# Patient Record
Sex: FEMALE | Race: OTHER | Employment: FULL TIME | URBAN - METROPOLITAN AREA
[De-identification: names, ages, dates, MRNs, and addresses within clinical notes are randomized per-mention and may not be internally consistent; named-entity substitution may affect disease eponyms.]

---

## 2023-02-17 ENCOUNTER — HOSPITAL ENCOUNTER (OUTPATIENT)
Age: 38
Discharge: HOME | End: 2023-02-17
Attending: EMERGENCY MEDICINE
Payer: COMMERCIAL

## 2023-02-17 ENCOUNTER — APPOINTMENT (OUTPATIENT)
Dept: GENERAL RADIOLOGY | Age: 38
End: 2023-02-17
Attending: EMERGENCY MEDICINE
Payer: COMMERCIAL

## 2023-02-17 VITALS
TEMPERATURE: 97 F | DIASTOLIC BLOOD PRESSURE: 84 MMHG | SYSTOLIC BLOOD PRESSURE: 152 MMHG | HEART RATE: 77 BPM | OXYGEN SATURATION: 98 % | RESPIRATION RATE: 22 BRPM

## 2023-02-17 DIAGNOSIS — J18.9 COMMUNITY ACQUIRED PNEUMONIA, UNSPECIFIED LATERALITY: Primary | ICD-10-CM

## 2023-02-17 PROCEDURE — 93010 ELECTROCARDIOGRAM REPORT: CPT

## 2023-02-17 PROCEDURE — 99204 OFFICE O/P NEW MOD 45 MIN: CPT

## 2023-02-17 PROCEDURE — 93005 ELECTROCARDIOGRAM TRACING: CPT

## 2023-02-17 PROCEDURE — 71046 X-RAY EXAM CHEST 2 VIEWS: CPT | Performed by: EMERGENCY MEDICINE

## 2023-02-17 RX ORDER — AZITHROMYCIN 250 MG/1
TABLET, FILM COATED ORAL
Qty: 6 TABLET | Refills: 0 | Status: SHIPPED | OUTPATIENT
Start: 2023-02-17 | End: 2023-02-22

## 2023-02-17 RX ORDER — ALBUTEROL SULFATE 90 UG/1
2 AEROSOL, METERED RESPIRATORY (INHALATION) EVERY 4 HOURS PRN
Qty: 1 EACH | Refills: 0 | Status: SHIPPED | OUTPATIENT
Start: 2023-02-17 | End: 2023-03-19

## 2023-02-17 NOTE — ED INITIAL ASSESSMENT (HPI)
Patient reports that within the last month she has been vaping more and more. Patient has noticed more shortness of breath, chest pain, coughing up mucus, sore throat, and some right ear pain. Patient stopped vaping within this past month. Patient noticed her chest pressure today was more constant.

## 2023-02-19 LAB
ATRIAL RATE: 76 BPM
P AXIS: 53 DEGREES
P-R INTERVAL: 138 MS
Q-T INTERVAL: 414 MS
QRS DURATION: 78 MS
QTC CALCULATION (BEZET): 465 MS
R AXIS: 12 DEGREES
T AXIS: 24 DEGREES
VENTRICULAR RATE: 76 BPM

## 2023-03-31 ENCOUNTER — APPOINTMENT (OUTPATIENT)
Dept: GENERAL RADIOLOGY | Age: 38
End: 2023-03-31
Attending: NURSE PRACTITIONER
Payer: COMMERCIAL

## 2023-03-31 ENCOUNTER — HOSPITAL ENCOUNTER (OUTPATIENT)
Age: 38
Discharge: HOME | End: 2023-03-31
Payer: COMMERCIAL

## 2023-03-31 VITALS
DIASTOLIC BLOOD PRESSURE: 88 MMHG | RESPIRATION RATE: 20 BRPM | OXYGEN SATURATION: 97 % | HEART RATE: 88 BPM | SYSTOLIC BLOOD PRESSURE: 148 MMHG | TEMPERATURE: 98 F

## 2023-03-31 DIAGNOSIS — J40 BRONCHITIS: Primary | ICD-10-CM

## 2023-03-31 PROCEDURE — 71046 X-RAY EXAM CHEST 2 VIEWS: CPT | Performed by: NURSE PRACTITIONER

## 2023-03-31 PROCEDURE — 99214 OFFICE O/P EST MOD 30 MIN: CPT

## 2023-03-31 PROCEDURE — 99213 OFFICE O/P EST LOW 20 MIN: CPT

## 2023-03-31 RX ORDER — ALBUTEROL SULFATE 90 UG/1
2 AEROSOL, METERED RESPIRATORY (INHALATION) EVERY 4 HOURS PRN
Qty: 1 EACH | Refills: 0 | Status: SHIPPED | OUTPATIENT
Start: 2023-03-31 | End: 2023-04-30

## 2023-03-31 RX ORDER — BENZONATATE 100 MG/1
100 CAPSULE ORAL 3 TIMES DAILY PRN
Qty: 20 CAPSULE | Refills: 0 | Status: SHIPPED | OUTPATIENT
Start: 2023-03-31

## 2023-03-31 NOTE — DISCHARGE INSTRUCTIONS
Take over-the-counter ibuprofen or Tylenol for pain. Use albuterol inhaler as needed take benzonatate as needed to help with the cough. Drink plenty of fluids warm tea with honey follow-up with resource provided for primary care doctor in 2 to 3 days. If you develop any new or worsening symptoms go to the nearest emergency department as discussed.

## 2023-03-31 NOTE — ED INITIAL ASSESSMENT (HPI)
PATIENT ARRIVED AMBULATORY TO ROOM C/O SYMPTOMS THAT STARTED 2 WEEKS AGO. PATIENT WAS IN THE IC ON 2/17 FOR THE SAME SYMPTOMS AND WAS DX WITH PNEUMONIA. PATIENT STATES SYMPTOMS WERE IMPROVING BUT RETURNED 2 WEEKS AGO. +COUGH NO NASAL CONGESTION. NO FEVERS. +INTERMITTENT SOB. +RIGHT SIDED MID BACK PAIN.

## 2023-06-14 ENCOUNTER — APPOINTMENT (OUTPATIENT)
Dept: GENERAL RADIOLOGY | Age: 38
End: 2023-06-14
Attending: EMERGENCY MEDICINE
Payer: COMMERCIAL

## 2023-06-14 ENCOUNTER — HOSPITAL ENCOUNTER (OUTPATIENT)
Age: 38
Discharge: HOME | End: 2023-06-14
Attending: EMERGENCY MEDICINE
Payer: COMMERCIAL

## 2023-06-14 VITALS
OXYGEN SATURATION: 98 % | DIASTOLIC BLOOD PRESSURE: 80 MMHG | SYSTOLIC BLOOD PRESSURE: 141 MMHG | HEART RATE: 92 BPM | TEMPERATURE: 97 F | RESPIRATION RATE: 24 BRPM

## 2023-06-14 DIAGNOSIS — J98.01 BRONCHOSPASM: ICD-10-CM

## 2023-06-14 DIAGNOSIS — J40 BRONCHITIS: Primary | ICD-10-CM

## 2023-06-14 LAB — SARS-COV-2 RNA RESP QL NAA+PROBE: NOT DETECTED

## 2023-06-14 PROCEDURE — 94640 AIRWAY INHALATION TREATMENT: CPT

## 2023-06-14 PROCEDURE — 99214 OFFICE O/P EST MOD 30 MIN: CPT

## 2023-06-14 PROCEDURE — 93005 ELECTROCARDIOGRAM TRACING: CPT

## 2023-06-14 PROCEDURE — 71046 X-RAY EXAM CHEST 2 VIEWS: CPT | Performed by: EMERGENCY MEDICINE

## 2023-06-14 PROCEDURE — 93010 ELECTROCARDIOGRAM REPORT: CPT

## 2023-06-14 RX ORDER — IPRATROPIUM BROMIDE AND ALBUTEROL SULFATE 2.5; .5 MG/3ML; MG/3ML
3 SOLUTION RESPIRATORY (INHALATION) ONCE
Status: COMPLETED | OUTPATIENT
Start: 2023-06-14 | End: 2023-06-14

## 2023-06-14 RX ORDER — PREDNISONE 20 MG/1
40 TABLET ORAL DAILY
Qty: 10 TABLET | Refills: 0 | Status: SHIPPED | OUTPATIENT
Start: 2023-06-14 | End: 2023-06-19

## 2023-06-14 NOTE — ED INITIAL ASSESSMENT (HPI)
Patient reports having shortness of breath since Monday and was prescribed an Albuterol Inhaler which she has been taking every 4 hours since then.  Patient also with a cough, chest congestion, and some chest pressure on the left side of her chest.

## 2023-06-15 LAB
ATRIAL RATE: 83 BPM
P AXIS: 68 DEGREES
P-R INTERVAL: 130 MS
Q-T INTERVAL: 366 MS
QRS DURATION: 78 MS
QTC CALCULATION (BEZET): 430 MS
R AXIS: 28 DEGREES
T AXIS: 35 DEGREES
VENTRICULAR RATE: 83 BPM

## 2023-12-28 ENCOUNTER — HOSPITAL ENCOUNTER (OUTPATIENT)
Age: 38
Discharge: HOME | End: 2023-12-28
Payer: COMMERCIAL

## 2023-12-28 ENCOUNTER — APPOINTMENT (OUTPATIENT)
Dept: GENERAL RADIOLOGY | Age: 38
End: 2023-12-28
Attending: NURSE PRACTITIONER
Payer: COMMERCIAL

## 2023-12-28 VITALS
OXYGEN SATURATION: 93 % | HEART RATE: 94 BPM | SYSTOLIC BLOOD PRESSURE: 147 MMHG | DIASTOLIC BLOOD PRESSURE: 92 MMHG | TEMPERATURE: 99 F | RESPIRATION RATE: 20 BRPM

## 2023-12-28 DIAGNOSIS — J11.1 INFLUENZA: Primary | ICD-10-CM

## 2023-12-28 LAB
POCT INFLUENZA A: POSITIVE
POCT INFLUENZA B: NEGATIVE

## 2023-12-28 PROCEDURE — 71046 X-RAY EXAM CHEST 2 VIEWS: CPT | Performed by: NURSE PRACTITIONER

## 2023-12-28 PROCEDURE — 99214 OFFICE O/P EST MOD 30 MIN: CPT

## 2023-12-28 PROCEDURE — 87502 INFLUENZA DNA AMP PROBE: CPT | Performed by: NURSE PRACTITIONER

## 2023-12-28 PROCEDURE — 99213 OFFICE O/P EST LOW 20 MIN: CPT

## 2023-12-28 RX ORDER — OSELTAMIVIR PHOSPHATE 75 MG/1
75 CAPSULE ORAL 2 TIMES DAILY
Qty: 10 CAPSULE | Refills: 0 | Status: SHIPPED | OUTPATIENT
Start: 2023-12-28 | End: 2024-01-02

## 2023-12-28 NOTE — ED INITIAL ASSESSMENT (HPI)
Patient reports cough, body aches and fevers since Tuesday. Voice hoarseness as well. Negative at home covid tests. Hx of pneumonia.

## 2024-07-03 ENCOUNTER — HOSPITAL ENCOUNTER (OUTPATIENT)
Age: 39
Discharge: HOME | End: 2024-07-03
Attending: EMERGENCY MEDICINE
Payer: COMMERCIAL

## 2024-07-03 ENCOUNTER — APPOINTMENT (OUTPATIENT)
Dept: GENERAL RADIOLOGY | Age: 39
End: 2024-07-03
Attending: EMERGENCY MEDICINE
Payer: COMMERCIAL

## 2024-07-03 VITALS
TEMPERATURE: 98 F | RESPIRATION RATE: 18 BRPM | HEART RATE: 81 BPM | SYSTOLIC BLOOD PRESSURE: 140 MMHG | DIASTOLIC BLOOD PRESSURE: 78 MMHG | OXYGEN SATURATION: 100 %

## 2024-07-03 DIAGNOSIS — J06.9 UPPER RESPIRATORY TRACT INFECTION, UNSPECIFIED TYPE: Primary | ICD-10-CM

## 2024-07-03 LAB — SARS-COV-2 RNA RESP QL NAA+PROBE: NOT DETECTED

## 2024-07-03 PROCEDURE — 99214 OFFICE O/P EST MOD 30 MIN: CPT

## 2024-07-03 PROCEDURE — 71046 X-RAY EXAM CHEST 2 VIEWS: CPT | Performed by: EMERGENCY MEDICINE

## 2024-07-03 RX ORDER — ALBUTEROL SULFATE 90 UG/1
2 AEROSOL, METERED RESPIRATORY (INHALATION) EVERY 4 HOURS PRN
Qty: 6.7 G | Refills: 0 | Status: SHIPPED | OUTPATIENT
Start: 2024-07-03

## 2024-07-03 NOTE — ED PROVIDER NOTES
Patient Seen in: [facility] [place]      History     Chief Complaint   Patient presents with    Cough/URI     Stated Complaint: COUGH    Subjective:     39-year-old female presents today for evaluation of cough congestion has been going the past few days.  Patient using inhaler at home and is nearly out.  She is allergic to cats but has 3 cats at home.  Intermittent wheezing.  No fevers.  No chest pain.  Mild heavy breathing when she has the wheezing.  No pain or swelling in her legs.  Symptoms are acute mild.    Objective:   History reviewed. No pertinent past medical history.           History reviewed. No pertinent surgical history.             No pertinent social history.              Physical Exam     ED Triage Vitals [07/03/24 4497]   /78   Pulse 81   Resp 18   Temp 97.5 °F (36.4 °C)   Temp src Temporal   SpO2 100 %   O2 Device        Current:/78   Pulse 81   Temp 97.5 °F (36.4 °C) (Temporal)   Resp 18   SpO2 100%         Physical Exam  Vitals reviewed.   Constitutional:       General: She is not in acute distress.     Appearance: Normal appearance. She is not ill-appearing or toxic-appearing.   HENT:      Head: Normocephalic and atraumatic.      Mouth/Throat:      Mouth: Mucous membranes are moist.      Pharynx: Oropharynx is clear. No pharyngeal swelling, oropharyngeal exudate or posterior oropharyngeal erythema.   Eyes:      Extraocular Movements: Extraocular movements intact.      Conjunctiva/sclera: Conjunctivae normal.   Cardiovascular:      Rate and Rhythm: Normal rate and regular rhythm.   Pulmonary:      Effort: Pulmonary effort is normal.      Breath sounds: Normal breath sounds.   Musculoskeletal:      Cervical back: Neck supple.   Skin:     General: Skin is warm and dry.   Neurological:      Mental Status: She is alert and oriented to person, place, and time.   Psychiatric:         Mood and Affect: Mood normal.         ED Course     Labs Reviewed   RAPID SARS-COV-2 BY PCR -  Normal     Imaging:  No results found.    ED Course as of 07/03/24 1917  ------------------------------------------------------------  Time: 07/03 1916  Comment: COVID and chest x-ray are unremarkable.  Will send home with albuterol inhaler.            MDM        39 year old female with cough running out of inhaler.  Will check chest x-ray and check COVID.    Differential diagnosis (including but not limited to):  COVID, bronchitis, other viral syndrome, environmental allergies, cat allergies    ED course:  Pulse Ox: 100% on room air which is normal      Comment: Please note this report has been produced using speech recognition software and may contain errors related to that system including errors in grammar, punctuation, and spelling, as well as words and phrases that may be inappropriate. If there are any questions or concerns please feel free to contact the dictating provider for clarification.                                     Medical Decision Making      Disposition and Plan     Clinical Impression:  1. Upper respiratory tract infection, unspecified type         Disposition:  Discharge  7/3/2024  7:17 pm    Follow-up:  Zeeshan Vanessa MD  [address] [place] [id number]  [phone]    Schedule an appointment as soon as possible for a visit   This is a family practice doctor          Medications Prescribed:  Current Discharge Medication List        START taking these medications    Details   albuterol 108 (90 Base) MCG/ACT Inhalation Aero Soln Inhale 2 puffs into the lungs every 4 (four) hours as needed for Wheezing.  Qty: 6.7 g, Refills: 0                                    Armando South MD  7/3/2024  6:54 PM

## 2024-07-03 NOTE — ED INITIAL ASSESSMENT (HPI)
Patient reports 5 day hx of cough, nasal congestion and \"heavy breathing.\"  Hx of pneumonia and bronchitis in the past.  States she has a cat allergy and has 3 cats.  Taking zyrtec.  States her albuterol inhaler is almost completed and she needs a refill.

## 2024-07-04 NOTE — DISCHARGE INSTRUCTIONS
Thank you for visiting our immediate care for your health care needs.  Please follow up with your regular doctor in the next 1-2 days.  If you have any additional problems please return to the immediate care.  Please use albuterol as prescribed.

## 2024-07-26 ENCOUNTER — HOSPITAL ENCOUNTER (OUTPATIENT)
Age: 39
Discharge: HOME | End: 2024-07-26
Payer: COMMERCIAL

## 2024-07-26 VITALS
RESPIRATION RATE: 18 BRPM | DIASTOLIC BLOOD PRESSURE: 85 MMHG | HEIGHT: 63 IN | BODY MASS INDEX: 31.89 KG/M2 | SYSTOLIC BLOOD PRESSURE: 124 MMHG | WEIGHT: 180 LBS | TEMPERATURE: 99 F | OXYGEN SATURATION: 97 % | HEART RATE: 93 BPM
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
## 2024-07-26 DIAGNOSIS — S01.85XA CAT BITE OF FACE, INITIAL ENCOUNTER: Primary | ICD-10-CM

## 2024-07-26 PROCEDURE — 90471 IMMUNIZATION ADMIN: CPT

## 2024-07-26 PROCEDURE — 99214 OFFICE O/P EST MOD 30 MIN: CPT

## 2024-07-26 PROCEDURE — 99213 OFFICE O/P EST LOW 20 MIN: CPT

## 2024-07-26 RX ORDER — AMOXICILLIN AND CLAVULANATE POTASSIUM 875; 125 MG/1; MG/1
1 TABLET, FILM COATED ORAL 2 TIMES DAILY
Qty: 20 TABLET | Refills: 0 | Status: SHIPPED | OUTPATIENT
Start: 2024-07-26 | End: 2024-08-05

## 2024-07-26 NOTE — ED PROVIDER NOTES
Patient Seen in: [facility]      History     Chief Complaint   Patient presents with    Bite     Stated Complaint: cat bite  Subjective:   Jo is a 39-year-old female presenting to the immediate care complaining of a cat bite.  Patient states that her cat was sitting on her lap yesterday when it turned around and jumped on her.  Patient has a bite murray to her forehead, scratch marks to her neck and arms.  Patient states that she put ice on it but noticed some increased swelling to the forehead today so she came in for evaluation.  Denies any injury to the eyes or any other part of the face.  Patient states that she has a mild headache; denies any other systemic symptoms.  She has not had a fever.  Patient has no other complaints or concerns.        Objective:   History reviewed. No pertinent past medical history.         History reviewed. No pertinent surgical history.           Social History     Socioeconomic History    Marital status: Single   Tobacco Use    Smoking status: Unknown   Vaping Use    Vaping status: Never Used     Social Determinants of Health      [facility] [employer] [employer]    [facility]            Review of Systems    Positive for stated complaint: Bite    Other systems are as noted in HPI.  Constitutional and vital signs reviewed.      All other systems reviewed and negative except as noted above.    Physical Exam     ED Triage Vitals [07/26/24 0930]   /85   Pulse 93   Resp 18   Temp 98.6 °F (37 °C)   Temp src Temporal   SpO2 97 %   O2 Device None (Room air)     Current:/85   Pulse 93   Temp 98.6 °F (37 °C) (Temporal)   Resp 18   Ht 160 cm (5' 3\")   Wt 81.6 kg   LMP 07/19/2024   SpO2 97%   BMI 31.89 kg/m²     Physical Exam  Vitals and nursing note reviewed.   Constitutional:       General: She is not in acute distress.     Appearance: Normal appearance. She is not ill-appearing, toxic-appearing or diaphoretic.   HENT:      Head:  Normocephalic.   Cardiovascular:      Rate and Rhythm: Normal rate.   Pulmonary:      Effort: Pulmonary effort is normal.   Musculoskeletal:         General: Normal range of motion.      Cervical back: Normal range of motion.   Skin:     General: Skin is warm and dry.      Capillary Refill: Capillary refill takes less than 2 seconds.      Findings: Abrasion and wound present.      Comments: Patient has puncture wounds to her forehead with surrounding swelling and mild erythema.  There is no obvious abscess.  No drainage, no bleeding.  Patient also has scratch marks to her posterior neck.  No swelling, no bleeding, no drainage.  There are also scratch marks on the patient's right arm and left wrist.  No swelling, no bleeding, no drainage.   Neurological:      General: No focal deficit present.      Mental Status: She is alert and oriented to person, place, and time.   Psychiatric:         Mood and Affect: Mood normal.         Behavior: Behavior normal.         Thought Content: Thought content normal.         Judgment: Judgment normal.         ED Course   Radiology:  No results found.  Labs Reviewed - No data to display    MDM     Medical Decision Making  Differential diagnoses reflecting the complexity of care include but are not limited to cat bite, cellulitis, abscess, toxoplasmosis.    Comorbidities that add complexity to management include: None  History obtained by an independent source was from: Patient  Patient is well appearing, non-toxic and in no acute distress.  Vital signs are stable.   History and physical exam are consistent with an animal bite to the face.  Sent a prescription for Augmentin for antibiotic prophylaxis.  Tetanus was updated.  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  She states that the cat is not up-to-date on all of its immunizations however other animals in the house are up-to-date on everything.  She states that she has had the cat for 4 years and it has been only an indoor  cat.  She states that her cats never go outside.    Patient reports that this cat has bitten her in the past.  She states that she is going to take the cat to shelter today.  I recommended that she report to the shelter that the cat bit her.  Recommended the patient wash the area at least twice a day and put a small amount of antibiotic ointment over the area.  Recommended that patient  watch for signs of infection including redness, swelling, drainage, fever if any of those things occur or if they have any other concerning or worsening complaints they should go to the emergency department.  Recomended that patient make an appointment with primary care doctor for wound check in the next few days.    ED precautions discussed.  Patient (guardian) advised to follow up with PCP in 2-3 days.  Patient (guardian) agrees with this plan of care.  Patient (guardian) verbalizes understanding of discharge instructions and plan of care.      Risk  OTC drugs.  Prescription drug management.        Disposition and Plan     Clinical Impression:  1. Cat bite of face, initial encounter         Disposition:  Discharge  7/26/2024  9:47 am    Follow-up:  Rae Cavazos MD  [address] [place] [address]  [phone]                Medications Prescribed:  Discharge Medication List as of 7/26/2024  9:47 AM        START taking these medications    Details   amoxicillin clavulanate 875-125 MG Oral Tab Take 1 tablet by mouth 2 (two) times daily for 10 days., Normal, Disp-20 tablet, R-0

## 2024-07-26 NOTE — DISCHARGE INSTRUCTIONS
Please take the antibiotics as prescribed.    We updated your tetanus today.  Please wash the area twice a day.    Watch for signs of infection including redness, swelling, drainage, fever if any of those things occur or if you have any other concerning or worsening complaints you should go to the emergency department.    Make an appointment with your primary care doctor for wound check in the next few days.  When you take the animal to the shelter/vet be sure to tell them the cat bit you.

## 2024-07-27 ENCOUNTER — HOSPITAL ENCOUNTER (EMERGENCY)
Facility: HOSPITAL | Age: 39
Discharge: HOME | End: 2024-07-27
Attending: EMERGENCY MEDICINE
Payer: COMMERCIAL

## 2024-07-27 VITALS
RESPIRATION RATE: 18 BRPM | TEMPERATURE: 98 F | OXYGEN SATURATION: 98 % | DIASTOLIC BLOOD PRESSURE: 92 MMHG | WEIGHT: 180 LBS | SYSTOLIC BLOOD PRESSURE: 138 MMHG | HEART RATE: 79 BPM | BODY MASS INDEX: 32 KG/M2
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
## 2024-07-27 DIAGNOSIS — L03.211 CELLULITIS OF FACE: ICD-10-CM

## 2024-07-27 PROCEDURE — 99282 EMERGENCY DEPT VISIT SF MDM: CPT

## 2024-07-27 NOTE — DISCHARGE INSTRUCTIONS
Return if any worsening symptoms.   Please take cetirizine 10mg (zyrtec) daily. Can take benadryl at night if needed.     
Never smoker

## 2024-07-27 NOTE — ED INITIAL ASSESSMENT (HPI)
Patient complains of facial swelling since today, states she was bitten by her cat two days ago, seen at immediate care yesterday and started on AB,

## 2024-07-27 NOTE — ED PROVIDER NOTES
Patient Seen in: [facility] Emergency Department    History     Chief Complaint   Patient presents with    Swelling       HPI    39-year-old female who presents emergency department with second day of mild swelling at the mid forehead ever since she was bit by her cat at home at the forehead went to an urgent care yesterday was prescribed Augmentin, she took her first dose last night.  She denies any fevers or any significant increase of the swelling however was concerned and came to the emergency department.      History reviewed. History reviewed. No pertinent past medical history.    History reviewed. History reviewed. No pertinent surgical history.      Medications :  (Not in a hospital admission)       No family history on file.    Smoking Status:   Social History     Socioeconomic History    Marital status: Single   Tobacco Use    Smoking status: Unknown   Vaping Use    Vaping status: Never Used       Constitutional and vital signs reviewed.      Social History and Family History elements reviewed from today, pertinent positives to the presenting problem noted.    Physical Exam     ED Triage Vitals   BP 07/27/24 1057 147/79   Pulse 07/27/24 1057 87   Resp 07/27/24 1057 18   Temp 07/27/24 1057 98 °F (36.7 °C)   Temp src --    SpO2 07/27/24 1057 98 %   O2 Device 07/27/24 1215 None (Room air)       All measures to prevent infection transmission during my interaction with the patient were taken. The patient was already wearing a droplet mask on my arrival to the room. Personal protective equipment was worn throughout the duration of the exam.  Handwashing was performed prior to and after the exam.  Stethoscope and any equipment used during my examination was cleaned with super sani-cloth germicidal wipes following the exam.     Physical Exam    General: NAD  Head: Healing abrasions at the mid forehead, with trace erythema associated with this.  Mouth/Throat/Ears/Nose: Oropharynx is clear and moist.    Eyes: Conjunctivae and EOM are normal.   Neck: Normal range of motion. Supple.   Cardiovascular: Normal rate, regular rhythm, normal heart sounds.  Respiratory/Chest: Clear and equal bilaterally. Exhibits no tenderness.  Gastrointestinal: Soft, non-tender, non-distended. Bowel sounds are normal.   Musculoskeletal:No swelling or deformity.   Neurological: Alert and appropriate. No focal deficits.   Skin: Skin is warm and dry. No pallor.  Psychiatric: Has a normal mood and affect.      ED Course      Labs Reviewed - No data to display    As Interpreted by me    Imaging Results Available and Reviewed while in ED: No results found.  ED Medications Administered: Medications - No data to display      MDM     Vitals:    07/27/24 1057 07/27/24 1215   BP: 147/79 (!) 138/92   Pulse: 87 79   Resp: 18    Temp: 98 °F (36.7 °C)    SpO2: 98% 98%   Weight: 81.6 kg      *I personally reviewed and interpreted all ED vitals.    Pulse Ox: 98%, Room air, Normal          Medical Decision Making      Differential Diagnosis/ Diagnostic Considerations: Cat bite, cellulitis, allergic action    Complicating Factors: The patient already has does not have a problem list on file. to contribute to the complexity of this ED evaluation.    I reviewed prior chart records including urgent care visit note from yesterday when patient was seen for the cat bite and prescribed Augmentin and provided with tetanus booster.  Patient without much improvement since starting the Augmentin last night however I discussed with her that we would not expect to see a dramatic change with less than 24 hours of antibiotic usage, which she understands.  I also discussed with her to start antihistamines with cetirizine given she does have mild allergies to cats and has had a mild amount of pruritus at her face.  Discharged in stable condition.  Patient is comfortable with the plan.        Disposition and Plan     Clinical Impression:  1. Cat bite, initial encounter     2. Cellulitis of face        Disposition:  Discharge    Follow-up:  Corinna Humphreys MD  [address] [facility]  [phone]  [facility] [address]  [phone]    Schedule an appointment as soon as possible for a visit in 1 day(s)        Medications Prescribed:  Discharge Medication List as of 7/27/2024 12:13 PM

## 2024-11-06 ENCOUNTER — HOSPITAL ENCOUNTER (OUTPATIENT)
Age: 39
Discharge: HOME | End: 2024-11-06
Payer: COMMERCIAL

## 2024-11-06 ENCOUNTER — HOSPITAL ENCOUNTER (EMERGENCY)
Facility: HOSPITAL | Age: 39
Discharge: HOME | End: 2024-11-07
Attending: EMERGENCY MEDICINE
Payer: COMMERCIAL

## 2024-11-06 ENCOUNTER — APPOINTMENT (OUTPATIENT)
Dept: GENERAL RADIOLOGY | Age: 39
End: 2024-11-06
Attending: PHYSICIAN ASSISTANT
Payer: COMMERCIAL

## 2024-11-06 VITALS
SYSTOLIC BLOOD PRESSURE: 141 MMHG | OXYGEN SATURATION: 98 % | DIASTOLIC BLOOD PRESSURE: 83 MMHG | TEMPERATURE: 98 F | HEART RATE: 111 BPM | RESPIRATION RATE: 25 BRPM

## 2024-11-06 DIAGNOSIS — J40 BRONCHITIS: Primary | ICD-10-CM

## 2024-11-06 DIAGNOSIS — J40 BRONCHITIS WITH WHEEZING: Primary | ICD-10-CM

## 2024-11-06 LAB
POCT INFLUENZA A: NEGATIVE
POCT INFLUENZA B: NEGATIVE
SARS-COV-2 RNA RESP QL NAA+PROBE: NOT DETECTED

## 2024-11-06 PROCEDURE — 94640 AIRWAY INHALATION TREATMENT: CPT

## 2024-11-06 PROCEDURE — 94644 CONT INHLJ TX 1ST HOUR: CPT

## 2024-11-06 PROCEDURE — 99214 OFFICE O/P EST MOD 30 MIN: CPT

## 2024-11-06 PROCEDURE — 71046 X-RAY EXAM CHEST 2 VIEWS: CPT | Performed by: PHYSICIAN ASSISTANT

## 2024-11-06 PROCEDURE — 99284 EMERGENCY DEPT VISIT MOD MDM: CPT

## 2024-11-06 PROCEDURE — S0119 ONDANSETRON 4 MG: HCPCS

## 2024-11-06 PROCEDURE — 87502 INFLUENZA DNA AMP PROBE: CPT | Performed by: PHYSICIAN ASSISTANT

## 2024-11-06 RX ORDER — PREDNISONE 20 MG/1
40 TABLET ORAL DAILY
Qty: 10 TABLET | Refills: 0 | Status: SHIPPED | OUTPATIENT
Start: 2024-11-07 | End: 2024-11-12

## 2024-11-06 RX ORDER — ALBUTEROL SULFATE 90 UG/1
2 INHALANT RESPIRATORY (INHALATION) EVERY 4 HOURS PRN
Qty: 1 EACH | Refills: 0 | Status: SHIPPED | OUTPATIENT
Start: 2024-11-06 | End: 2024-12-06

## 2024-11-06 RX ORDER — ALBUTEROL SULFATE 5 MG/ML
15 SOLUTION RESPIRATORY (INHALATION) ONCE
Status: COMPLETED | OUTPATIENT
Start: 2024-11-06 | End: 2024-11-06

## 2024-11-06 RX ORDER — IPRATROPIUM BROMIDE AND ALBUTEROL SULFATE 2.5; .5 MG/3ML; MG/3ML
3 SOLUTION RESPIRATORY (INHALATION) ONCE
Status: COMPLETED | OUTPATIENT
Start: 2024-11-06 | End: 2024-11-06

## 2024-11-06 RX ORDER — PREDNISONE 20 MG/1
60 TABLET ORAL ONCE
Status: COMPLETED | OUTPATIENT
Start: 2024-11-06 | End: 2024-11-06

## 2024-11-06 RX ORDER — ONDANSETRON 4 MG/1
4 TABLET, ORALLY DISINTEGRATING ORAL ONCE
Status: COMPLETED | OUTPATIENT
Start: 2024-11-06 | End: 2024-11-06

## 2024-11-07 VITALS
RESPIRATION RATE: 17 BRPM | SYSTOLIC BLOOD PRESSURE: 135 MMHG | HEART RATE: 123 BPM | OXYGEN SATURATION: 94 % | HEIGHT: 63 IN | BODY MASS INDEX: 32.78 KG/M2 | DIASTOLIC BLOOD PRESSURE: 93 MMHG | WEIGHT: 185 LBS | TEMPERATURE: 99 F

## 2024-11-07 RX ORDER — PREDNISONE 20 MG/1
20 TABLET ORAL ONCE
Status: DISCONTINUED | OUTPATIENT
Start: 2024-11-07 | End: 2024-11-07

## 2024-11-07 NOTE — ED INITIAL ASSESSMENT (HPI)
Patient with SOB since yesterday   States that she has been using her inhaler more frequent than every 4 hours   + cough

## 2024-11-07 NOTE — ED PROVIDER NOTES
Patient Seen in: [facility] [place]      History     Chief Complaint   Patient presents with    Shortness Of Breath     Stated Complaint: Shortness of breath/chest pains/cold symptons    Subjective:   HPI      39-year-old female presenting for evaluation of shortness of breath, congestion and wheezing.  Patient reports she developed a cough yesterday at work, by the evening she felt chest tightness, wheezing which persisted today.  There is no associated fevers.  She notes that used THC in vape/inhalation form over the weekend 1x and this has previously triggered similar symptoms.    Objective:     No pertinent past medical history.            No pertinent past surgical history.              No pertinent social history.            Review of Systems    Positive for stated complaint: Shortness of breath/chest pains/cold symptons  Other systems are as noted in HPI.  Constitutional and vital signs reviewed.      All other systems reviewed and negative except as noted above.    Physical Exam     ED Triage Vitals [11/06/24 1812]   /83   Pulse 99   Resp 25   Temp 97.9 °F (36.6 °C)   Temp src Temporal   SpO2 95 %   O2 Device None (Room air)       Current Vitals:   Vital Signs  BP: 141/83  Pulse: 111  Resp: 25  Temp: 97.9 °F (36.6 °C)  Temp src: Temporal    Oxygen Therapy  SpO2: 98 %  O2 Device: None (Room air)        Physical Exam  Vitals and nursing note reviewed.   Constitutional:       General: She is not in acute distress.  HENT:      Head: Normocephalic and atraumatic.      Right Ear: External ear normal.      Left Ear: External ear normal.      Nose: Nose normal.      Mouth/Throat:      Mouth: Mucous membranes are moist.   Eyes:      Extraocular Movements: Extraocular movements intact.      Pupils: Pupils are equal, round, and reactive to light.   Cardiovascular:      Rate and Rhythm: Normal rate.   Pulmonary:      Effort: Pulmonary effort is normal.      Breath sounds: Wheezing (inspiratory and expiratory  wheezing) present.   Abdominal:      General: Abdomen is flat.   Musculoskeletal:         General: Normal range of motion.      Cervical back: Normal range of motion.   Skin:     General: Skin is warm.   Neurological:      General: No focal deficit present.      Mental Status: She is alert and oriented to person, place, and time.   Psychiatric:         Mood and Affect: Mood normal.         Behavior: Behavior normal.             ED Course     Labs Reviewed   RAPID SARS-COV-2 BY PCR - Normal   POCT FLU TEST - Normal    Narrative:     This assay is a rapid molecular in vitro test utilizing nucleic acid amplification of influenza A and B viral RNA.        39-year-old female presenting for evaluation of cough, wheezing, shortness of breath    Ddx-bronchospasm, influenza, asthma exacerbation, pneumonia    Chest x-ray is negative for infiltrate or abnormality.  Influenza, COVID are negative.  After the initial DuoNeb treatment, the patient remained extremely tight with inspiratory and expiratory wheezing.  Second DuoNeb and 60 mg of prednisone was ordered    On reevaluation   patient with subjective improvement in symptoms after the second DuoNeb.  Lung sounds improved, some wheezing remains.  I did discuss emergency evaluation for continued observation, possible ongoing nebulizer treatment.  Patient opting to go home with continued use of her inhaler.  She notes that she will go to the emergency department if her symptoms worsen or persist         MDM              Medical Decision Making      Disposition and Plan     Clinical Impression:  1. Bronchitis with wheezing         Disposition:  Discharge  11/6/2024  7:34 pm    Follow-up:  Corinna Humphreys MD  [address] [place] [address]  [phone]  [facility] [id number]  [phone]                Medications Prescribed:  Discharge Medication List as of 11/6/2024  7:35 PM        START taking these medications    Details   !! albuterol 108 (90 Base) MCG/ACT Inhalation Aero Soln Inhale 2 puffs into the  lungs every 4 (four) hours as needed for Wheezing., Normal, Disp-1 each, R-0      predniSONE 20 MG Oral Tab Take 2 tablets (40 mg total) by mouth daily for 5 days., Normal, Disp-10 tablet, R-0       !! - Potential duplicate medications found. Please discuss with provider.              Supplementary Documentation:

## 2024-11-07 NOTE — DISCHARGE INSTRUCTIONS
If you feel your shortness of breath is persisting or worsening she should be seen in the emergency department

## 2024-11-07 NOTE — ED INITIAL ASSESSMENT (HPI)
Pt arrives through triage with family      complaints of coughing, melba today.Was seen at IC earlier today and told her to come to the ED if symptoms dont get better.    Inhaler w/o help. +wheezing noted         Hx of pna, bronchitis

## 2024-11-07 NOTE — ED PROVIDER NOTES
Patient Seen in: [facility] Department    History     Chief Complaint   Patient presents with    Wheezing       HPI    39-year-old female here with 2 days of dyspnea, cough, no fevers.  No chest pain.  Provided with a breathing treatment at urgent care she states with just slight relief.  Also was given 60 mg of prednisone    History reviewed. History reviewed. No pertinent past medical history.    History reviewed. History reviewed. No pertinent surgical history.      Medications :  Prescriptions Prior to Admission[1]     No family history on file.    Smoking Status:   Social History     Socioeconomic History    Marital status: Single   Tobacco Use    Smoking status: Unknown   Vaping Use    Vaping status: Never Used   Substance and Sexual Activity    Alcohol use: Never    Drug use: Never       Constitutional and vital signs reviewed.      Social History and Family History elements reviewed from today, pertinent positives to the presenting problem noted.    Physical Exam     ED Triage Vitals [11/06/24 2225]   /78   Pulse 111   Resp 25   Temp 99.1 °F (37.3 °C)   Temp src Oral   SpO2 91 %   O2 Device None (Room air)       All measures to prevent infection transmission during my interaction with the patient were taken. The patient was already wearing a droplet mask on my arrival to the room. Personal protective equipment was worn throughout the duration of the exam.  Handwashing was performed prior to and after the exam.  Stethoscope and any equipment used during my examination was cleaned with super sani-cloth germicidal wipes following the exam.     Physical Exam    General: NAD  Head: Normocephalic and atraumatic.  Mouth/Throat/Ears/Nose: Oropharynx is clear    Eyes: Conjunctivae and EOM are normal.   Neck: Normal range of motion. Supple.   Cardiovascular: Normal rate, regular rhythm, normal heart sounds.  Respiratory/Chest: wheezing, exhibits no tenderness. No tachypnea   Gastrointestinal:  Soft, non-tender, non-distended. Bowel sounds are normal.   Musculoskeletal:No swelling or deformity.   Neurological: Alert and appropriate. No focal deficits.   Skin: Skin is warm and dry. No pallor.  Psychiatric: Has a normal mood and affect.      ED Course      Labs Reviewed - No data to display    As Interpreted by me    Imaging Results Available and Reviewed while in ED: XR CHEST PA + LAT CHEST (CPT=71046)    Result Date: 11/6/2024  CONCLUSION:   No focal opacity, pleural effusion, or pneumothorax.    Dictated by (CST): Sandeep Goff MD on 11/06/2024 at 6:33 PM     Finalized by (CST): Sandeep Goff MD on 11/06/2024 at 6:34 PM         ED Medications Administered:   Medications   ipratropium (Atrovent) 0.02 % nebulizer solution 1.5 mg (0 mg Nebulization Stopped 11/7/24 0026)   albuterol (Ventolin) (5 MG/ML) 0.5% nebulizer solution 15 mg (15 mg Nebulization Given 11/6/24 2253)         MDM     Vitals:    11/06/24 2225 11/07/24 0015   BP: 140/78 (!) 135/93   Pulse: 111 (!) 123   Resp: 25 17   Temp: 99.1 °F (37.3 °C)    TempSrc: Oral    SpO2: 91% 94%   Weight: 83.9 kg    Height: 160 cm (5' 3\")      *I personally reviewed and interpreted all ED vitals.    Pulse Ox: 93%, Room air, Normal     Monitor Interpretation:   normal sinus rhythm as interpreted by me.  The cardiac monitor was ordered given dyspnea.      Medical Decision Making      Differential Diagnosis/ Diagnostic Considerations: pneumonia, bronchitis     Complicating Factors: The patient already has does not have a problem list on file. to contribute to the complexity of this ED evaluation.    I reviewed prior chart records including urgent care visit note from earlier today as well as the chest x-ray report as well as the imaging from earlier today.  There is no pneumonia on my interpretation of that x-ray.  She feels much improved after an hour-long of nebulizer treatments.  She already received 60 mg of prednisone at urgent care.    Considered  admission however she is normoxic on room air and demonstrates no signs of respiratory failure.    Dc In stable condition.  Patient is comfortable with the plan.     Prescriptions:already has prednisone prescription for urgent care    I spent 30 minutes of critical care time caring for this patient. This does not include time spent on separately reported billable procedures.       Disposition and Plan     Clinical Impression:  1. Bronchitis        Disposition:  Discharge    Follow-up:  Corinna uHmphreys MD  [address]  [address]  [place] [facility] [place] [address] [phone]    Schedule an appointment as soon as possible for a visit in 1 day(s)        Medications Prescribed:  Discharge Medication List as of 11/7/2024 12:26 AM                           [1] (Not in a hospital admission)

## 2024-11-24 ENCOUNTER — HOSPITAL ENCOUNTER (EMERGENCY)
Facility: HOSPITAL | Age: 39
Discharge: HOME | End: 2024-11-25
Attending: EMERGENCY MEDICINE
Payer: COMMERCIAL

## 2024-11-24 DIAGNOSIS — J40 BRONCHITIS: Primary | ICD-10-CM

## 2024-11-24 PROCEDURE — 99283 EMERGENCY DEPT VISIT LOW MDM: CPT

## 2024-11-24 PROCEDURE — 99284 EMERGENCY DEPT VISIT MOD MDM: CPT

## 2024-11-24 PROCEDURE — 94640 AIRWAY INHALATION TREATMENT: CPT

## 2024-11-24 RX ORDER — IPRATROPIUM BROMIDE AND ALBUTEROL SULFATE 2.5; .5 MG/3ML; MG/3ML
3 SOLUTION RESPIRATORY (INHALATION) ONCE
Status: COMPLETED | OUTPATIENT
Start: 2024-11-24 | End: 2024-11-24

## 2024-11-24 RX ORDER — ALBUTEROL SULFATE 90 UG/1
2 INHALANT RESPIRATORY (INHALATION) EVERY 4 HOURS PRN
Qty: 8 G | Refills: 0 | Status: SHIPPED | OUTPATIENT
Start: 2024-11-24 | End: 2024-12-24

## 2024-11-24 RX ORDER — ALBUTEROL SULFATE 0.83 MG/ML
2.5 SOLUTION RESPIRATORY (INHALATION) EVERY 4 HOURS PRN
Qty: 30 EACH | Refills: 0 | Status: SHIPPED | OUTPATIENT
Start: 2024-11-24 | End: 2024-12-24

## 2024-11-25 VITALS
HEIGHT: 63 IN | HEART RATE: 85 BPM | OXYGEN SATURATION: 95 % | BODY MASS INDEX: 31.89 KG/M2 | TEMPERATURE: 98 F | WEIGHT: 180 LBS | DIASTOLIC BLOOD PRESSURE: 72 MMHG | SYSTOLIC BLOOD PRESSURE: 136 MMHG | RESPIRATION RATE: 16 BRPM

## 2024-11-25 NOTE — ED PROVIDER NOTES
Patient Seen in: [facility] Emergency Department      History     Chief Complaint   Patient presents with    Cough    Difficulty Breathing     Stated Complaint: Difficulty breathing    Subjective:   HPI    Pt is 40 yo F who p/w chest tightness and wheezing with cough x 2 days. Using albuterol inhaler frequently with relief. No fevers. No smoking. No h/o asthma.     Objective:     History reviewed. No pertinent past medical history.           History reviewed. No pertinent surgical history.             Social History     Socioeconomic History    Marital status: Single   Tobacco Use    Smoking status: Unknown   Vaping Use    Vaping status: Never Used   Substance and Sexual Activity    Alcohol use: Never    Drug use: Never     Social Drivers of Health      Received from [facility]                  Physical Exam     ED Triage Vitals   BP 11/24/24 2211 136/72   Pulse 11/24/24 2211 99   Resp 11/24/24 2211 22   Temp 11/24/24 2211 97.9 °F (36.6 °C)   Temp src 11/24/24 2211 Temporal   SpO2 11/24/24 2211 96 %   O2 Device 11/24/24 2253 None (Room air)       Current Vitals:   Vital Signs  BP: 136/72  Pulse: 99  Resp: 22  Temp: 97.9 °F (36.6 °C)  Temp src: Temporal    Oxygen Therapy  SpO2: 96 %  O2 Device: None (Room air)        Physical Exam  GENERAL: No acute distress, awake and alert  HEENT: EOMI, PERRL  Neck: supple  CV: RRR, no murmurs  Resp: intermittent wheeze b/l, no retractions  Extremities: FROM of all extremities  Neuro: CN intact, normal speech, normal gait, 5/5 motor strength in all extremities, no focal deficits  SKIN: warm, dry, no rashes      ED Course   Labs Reviewed - No data to display     MDM      Medical Decision Making  Pt improved after duoneb in ED  Provided with nebulizer for home and rx as well as f/u PCP  Feels comfortable with discharge    Amount and/or Complexity of Data Reviewed  External Data Reviewed: radiology.     Details: Recent cxr 11/2024  reviewed    Risk  Prescription drug management.        Disposition and Plan     Clinical Impression:  1. Bronchitis         Disposition:  Discharge  11/24/2024 11:53 pm    Follow-up:  Corinna Humphreys MD  [address]  [address]  [facility] [address] [phone]    Follow up in 2 day(s)            Medications Prescribed:  Current Discharge Medication List        START taking these medications    Details   !! albuterol 108 (90 Base) MCG/ACT Inhalation Aero Soln Inhale 2 puffs into the lungs every 4 (four) hours as needed.  Qty: 8 g, Refills: 0      albuterol (2.5 MG/3ML) 0.083% Inhalation Nebu Soln Take 3 mL (2.5 mg total) by nebulization every 4 (four) hours as needed for Wheezing or Shortness of Breath.  Qty: 30 each, Refills: 0       !! - Potential duplicate medications found. Please discuss with provider.              Supplementary Documentation:

## 2024-11-25 NOTE — ED INITIAL ASSESSMENT (HPI)
Difficulty breathing all day today using albuterol inhaler Q 4 hours today last use about 20 minutes ago. Hx last week with bronchitis and was feeling better but began feeling worse this weekend.  Denies fevers.

## 2025-05-21 ENCOUNTER — APPOINTMENT (OUTPATIENT)
Dept: GENERAL RADIOLOGY | Age: 40
End: 2025-05-21
Attending: EMERGENCY MEDICINE
Payer: COMMERCIAL

## 2025-05-21 ENCOUNTER — HOSPITAL ENCOUNTER (OUTPATIENT)
Age: 40
Discharge: HOME | End: 2025-05-21
Attending: EMERGENCY MEDICINE
Payer: COMMERCIAL

## 2025-05-21 VITALS
OXYGEN SATURATION: 95 % | TEMPERATURE: 99 F | DIASTOLIC BLOOD PRESSURE: 97 MMHG | HEART RATE: 102 BPM | SYSTOLIC BLOOD PRESSURE: 125 MMHG | RESPIRATION RATE: 24 BRPM

## 2025-05-21 DIAGNOSIS — J98.01 ACUTE BRONCHOSPASM: ICD-10-CM

## 2025-05-21 DIAGNOSIS — J06.9 VIRAL URI WITH COUGH: Primary | ICD-10-CM

## 2025-05-21 LAB
#MXD IC: 0.6 X10ˆ3/UL (ref 0.1–1)
ATRIAL RATE: 97 BPM
BUN BLD-MCNC: <5 MG/DL (ref 7–18)
CHLORIDE BLD-SCNC: 101 MMOL/L (ref 98–112)
CO2 BLD-SCNC: 22 MMOL/L (ref 21–32)
CREAT BLD-MCNC: 0.9 MG/DL (ref 0.55–1.02)
DDIMER WHOLE BLOOD: <200 NG/ML DDU (ref ?–400)
EGFRCR SERPLBLD CKD-EPI 2021: 83 ML/MIN/1.73M2 (ref 60–?)
GLUCOSE BLD-MCNC: 113 MG/DL (ref 70–99)
HCT VFR BLD AUTO: 43.1 % (ref 35–48)
HCT VFR BLD CALC: 45 % (ref 34–50)
HGB BLD-MCNC: 14.1 G/DL (ref 12–16)
ISTAT IONIZED CALCIUM FOR CHEM 8: 1.13 MMOL/L (ref 1.12–1.32)
LYMPHOCYTES # BLD AUTO: 1 X10ˆ3/UL (ref 1–4)
LYMPHOCYTES NFR BLD AUTO: 11.5 %
MCH RBC QN AUTO: 31 PG (ref 26–34)
MCHC RBC AUTO-ENTMCNC: 32.7 G/DL (ref 31–37)
MCV RBC AUTO: 94.7 FL (ref 80–100)
MIXED CELL %: 6.7 %
NEUTROPHILS # BLD AUTO: 7.5 X10ˆ3/UL (ref 1.5–7.7)
NEUTROPHILS NFR BLD AUTO: 81.8 %
P AXIS: 72 DEGREES
P-R INTERVAL: 128 MS
PLATELET # BLD AUTO: 271 X10ˆ3/UL (ref 150–450)
POCT INFLUENZA A: NEGATIVE
POCT INFLUENZA B: NEGATIVE
POTASSIUM BLD-SCNC: 3.8 MMOL/L (ref 3.6–5.1)
Q-T INTERVAL: 340 MS
QRS DURATION: 80 MS
QTC CALCULATION (BEZET): 431 MS
R AXIS: 46 DEGREES
RBC # BLD AUTO: 4.55 X10ˆ6/UL (ref 3.8–5.3)
S PYO AG THROAT QL IA.RAPID: NEGATIVE
SARS-COV-2 RNA RESP QL NAA+PROBE: NOT DETECTED
SODIUM BLD-SCNC: 138 MMOL/L (ref 136–145)
T AXIS: 39 DEGREES
TROPONIN I BLD-MCNC: <0.02 NG/ML (ref ?–0.05)
VENTRICULAR RATE: 97 BPM
WBC # BLD AUTO: 9.1 X10ˆ3/UL (ref 4–11)

## 2025-05-21 PROCEDURE — 94640 AIRWAY INHALATION TREATMENT: CPT

## 2025-05-21 PROCEDURE — 80047 BASIC METABLC PNL IONIZED CA: CPT

## 2025-05-21 PROCEDURE — 87651 STREP A DNA AMP PROBE: CPT | Performed by: EMERGENCY MEDICINE

## 2025-05-21 PROCEDURE — 94644 CONT INHLJ TX 1ST HOUR: CPT

## 2025-05-21 PROCEDURE — 99215 OFFICE O/P EST HI 40 MIN: CPT

## 2025-05-21 PROCEDURE — 71046 X-RAY EXAM CHEST 2 VIEWS: CPT | Performed by: EMERGENCY MEDICINE

## 2025-05-21 PROCEDURE — 93005 ELECTROCARDIOGRAM TRACING: CPT

## 2025-05-21 PROCEDURE — 85378 FIBRIN DEGRADE SEMIQUANT: CPT | Performed by: EMERGENCY MEDICINE

## 2025-05-21 PROCEDURE — 87502 INFLUENZA DNA AMP PROBE: CPT | Performed by: EMERGENCY MEDICINE

## 2025-05-21 PROCEDURE — 36415 COLL VENOUS BLD VENIPUNCTURE: CPT

## 2025-05-21 PROCEDURE — 84484 ASSAY OF TROPONIN QUANT: CPT

## 2025-05-21 PROCEDURE — 93010 ELECTROCARDIOGRAM REPORT: CPT

## 2025-05-21 PROCEDURE — 85025 COMPLETE CBC W/AUTO DIFF WBC: CPT | Performed by: EMERGENCY MEDICINE

## 2025-05-21 RX ORDER — ALBUTEROL SULFATE 90 UG/1
2 INHALANT RESPIRATORY (INHALATION) EVERY 4 HOURS PRN
Qty: 1 EACH | Refills: 0 | Status: SHIPPED | OUTPATIENT
Start: 2025-05-21 | End: 2025-06-20

## 2025-05-21 RX ORDER — IPRATROPIUM BROMIDE AND ALBUTEROL SULFATE 2.5; .5 MG/3ML; MG/3ML
3 SOLUTION RESPIRATORY (INHALATION) ONCE
Status: COMPLETED | OUTPATIENT
Start: 2025-05-21 | End: 2025-05-21

## 2025-05-21 RX ORDER — ALBUTEROL SULFATE 0.83 MG/ML
2.5 SOLUTION RESPIRATORY (INHALATION) EVERY 4 HOURS PRN
Qty: 30 EACH | Refills: 0 | Status: SHIPPED | OUTPATIENT
Start: 2025-05-21 | End: 2025-06-20

## 2025-05-21 RX ORDER — PREDNISONE 20 MG/1
40 TABLET ORAL DAILY
Qty: 10 TABLET | Refills: 0 | Status: SHIPPED | OUTPATIENT
Start: 2025-05-21 | End: 2025-05-26

## 2025-05-21 NOTE — ED INITIAL ASSESSMENT (HPI)
Here for eval of productive cough x 1 week. Concerned about worsening cough and sob x 3 days. No fevers. Pt utilizing mdi and neb for ease of breathing. Reports lives w/ cats / allergic to , hx of smoking pens.

## 2025-05-21 NOTE — DISCHARGE INSTRUCTIONS
Prednisone, albuterol as prescribed.  Go to the emergency room for any worsening difficulty breathing.

## 2025-05-21 NOTE — ED PROVIDER NOTES
Patient Seen in: [facility]        History  Chief Complaint   Patient presents with    Cough/URI     Stated Complaint: Cough,Sore Throat,Mugus    Subjective:   HPI    The patient is a 32-year-old female with past history of bronchospasm, pneumonia/bronchitis who presents now with nasal congestion, sore throat, shortness of breath.  Patient states the symptoms started approximately 3 days ago.  The patient believes she may have had a low-grade fever.  Patient does have a sore throat.  Patient has had worsening cough associated with some shortness of breath over the last 2 days.  Patient does have a nebulizer at home and gave herself a neb treatment this morning but still feels somewhat short of breath.  Patient denies any focal chest pain though does feel some \"tightness\".  Patient is not on oral birth control but does have an IUD.      Objective:     History reviewed. No pertinent past medical history.           History reviewed. No pertinent surgical history.             Social History     Socioeconomic History    Marital status: Single   Tobacco Use    Smoking status: Unknown   Vaping Use    Vaping status: Former   Substance and Sexual Activity    Alcohol use: Never    Drug use: Never     Social Drivers of Health      [facility]              Review of Systems    Positive for stated complaint: Cough,Sore Throat,Mugus  Other systems are as noted in HPI.  Constitutional and vital signs reviewed.      All other systems reviewed and negative except as noted above.                  Physical Exam    ED Triage Vitals [05/21/25 1024]   BP (!) 125/97   Pulse 102   Resp 24   Temp 98.6 °F (37 °C)   Temp src Oral   SpO2 95 %   O2 Device None (Room air)       Current Vitals:   Vital Signs  BP: (!) 125/97  Pulse: 102  Resp: 24  Temp: 98.6 °F (37 °C)  Temp src: Oral    Oxygen Therapy  SpO2: 95 %  O2 Device: None (Room air)            Physical Exam    Constitutional: Well-developed  well-nourished in no acute distress  Head: Normocephalic, no swelling or tenderness  Eyes: Nonicteric sclera, no conjunctival injection  ENT: TMs are clear and flat bilaterally.  There is no posterior pharyngeal erythema  Chest: Moderate bilateral wheezing, no tenderness  Cardiovascular: Regular rate and rhythm without murmur  Abdomen: Soft, nontender and nondistended  Neurologic: Patient is awake, alert and oriented ×3.  The patient's motor strength is 5 out of 5 and symmetric in the upper and lower extremities bilaterally  Extremities: No focal swelling or tenderness  Skin: No pallor, no redness or warmth to the touch          ED Course  Labs Reviewed   POCT ISTAT CHEM8 CARTRIDGE - Abnormal; Notable for the following components:       Result Value    ISTAT BUN <5 (*)     ISTAT Glucose 113 (*)     All other components within normal limits   D-DIMER (POC) - Normal   ISTAT TROPONIN - Normal   RAPID SARS-COV-2 BY PCR - Normal   POCT FLU TEST - Normal    Narrative:     This assay is a rapid molecular in vitro test utilizing nucleic acid amplification of influenza A and B viral RNA.   RAPID STREP A - Normal   POCT CBC     EKG    Rate, intervals and axes as noted on EKG Report.  Rate: 97  Rhythm: Sinus Rhythm  Reading: Patient's EKG demonstrates a normal sinus rhythm with a rate of 97.  The patient's axis and intervals are normal.  There is no acute ST elevation.  Compared to an EKG done 6/14/2023, there are no acute changes              Pulse ox is 95% on room air, normal.  Patient's heart rate is 102.     Chest x-ray was independently reviewed by myself.  There is no acute cardiopulmonary process noted    Patient's lung sounds were improved after a DuoNeb here.      Lab results including negative flu/COVID/strep, normal D-dimer/CBC/electrolytes other than glucose of 113 and normal troponin  as well as chest x-ray and EKG results were discussed with the patient.  Will initiate prednisone/albuterol inhaler/albuterol for  nebulizer.  Probable viral etiology as symptoms just started 3 days ago.  Will provide with work note    MDM     shortness of breath including pulmonary infectious process, COPD, asthma, pulmonary embolus and congestive heart failure          Medical Decision Making      Disposition and Plan     Clinical Impression:  1. Viral URI with cough    2. Acute bronchospasm         Disposition:  Discharge  5/21/2025 11:14 am    Follow-up:  Corinna Humphreys MD  [address]  [address]  [facility] [address] [phone]    In 3 days  If symptoms worsen          Medications Prescribed:  Discharge Medication List as of 5/21/2025 11:23 AM        START taking these medications    Details   predniSONE 20 MG Oral Tab Take 2 tablets (40 mg total) by mouth daily for 5 days., Normal, Disp-10 tablet, R-0      !! albuterol 108 (90 Base) MCG/ACT Inhalation Aero Soln Inhale 2 puffs into the lungs every 4 (four) hours as needed for Wheezing., Normal, Disp-1 each, R-0      albuterol (2.5 MG/3ML) 0.083% Inhalation Nebu Soln Take 3 mL (2.5 mg total) by nebulization every 4 (four) hours as needed for Wheezing or Shortness of Breath., Normal, Disp-30 each, R-0       !! - Potential duplicate medications found. Please discuss with provider.                Supplementary Documentation:

## (undated) NOTE — LETTER
Date & Time: 6/14/2023, 7:50 PM  Patient: Moses Adrian  Encounter Provider(s):    Cara Gomez MD       To Whom It May Concern:    Moses Adrian was seen and treated in our department on 6/14/2023. She should not return to work until 6/17/23 .     If you have any questions or concerns, please do not hesitate to call.        _____________________________  Physician/APC Signature

## (undated) NOTE — LETTER
Date & Time: 12/28/2023, 3:34 PM  Patient: Sumit Uriarte  Encounter Provider(s):    CHRIS Nichole       To Whom It May Concern:    Sumit Uriarte was seen and treated in our department on 12/28/2023. She can return to work Jan 2, 2024. If you have any questions or concerns, please do not hesitate to call.       Vitaliy Carrington, BARBARA  _____________________________  [id number]/ZGA Signature

## (undated) NOTE — LETTER
Date & Time: 5/21/2025, 11:13 AM  Patient: Jo Cat  Encounter Provider(s):    Sandro Valenzuela MD       To Whom It May Concern:    Jo Cat was seen and treated in our department on 5/21/2025. She should not return to work until 5/26/2025.    If you have any questions or concerns, please do not hesitate to call.        _____________________________  Physician/APC Signature

## (undated) NOTE — LETTER
Date & Time: 11/7/2024, 12:26 AM  Patient: Jo Cat  Encounter Provider(s):    Brandie Lugo MD       To Whom It May Concern:    Jo Cat was seen and treated in our department on 11/6/2024. She should not return to work until 11/10/2024 .    If you have any questions or concerns, please do not hesitate to call.        _____________________________  Physician/APC Signature